# Patient Record
Sex: FEMALE | URBAN - METROPOLITAN AREA
[De-identification: names, ages, dates, MRNs, and addresses within clinical notes are randomized per-mention and may not be internally consistent; named-entity substitution may affect disease eponyms.]

---

## 2024-04-16 ENCOUNTER — APPOINTMENT (RX ONLY)
Dept: URBAN - METROPOLITAN AREA CLINIC 173 | Facility: CLINIC | Age: 69
Setting detail: DERMATOLOGY
End: 2024-04-16

## 2024-04-16 DIAGNOSIS — L90.5 SCAR CONDITIONS AND FIBROSIS OF SKIN: ICD-10-CM

## 2024-04-16 DIAGNOSIS — Z41.9 ENCOUNTER FOR PROCEDURE FOR PURPOSES OTHER THAN REMEDYING HEALTH STATE, UNSPECIFIED: ICD-10-CM

## 2024-04-16 PROCEDURE — ? COSMETIC CONSULTATION: SCLEROTHERAPY

## 2024-04-16 PROCEDURE — ? DIAGNOSIS COMMENT

## 2024-04-16 PROCEDURE — ? INTRALESIONAL KENALOG

## 2024-04-16 PROCEDURE — ? BOTOX

## 2024-04-16 PROCEDURE — ? TREATMENT REGIMEN

## 2024-04-16 PROCEDURE — ? COSMETIC CONSULTATION: BOTOX

## 2024-04-16 PROCEDURE — ? OTHER (COSMETIC)

## 2024-04-16 ASSESSMENT — LOCATION ZONE DERM: LOCATION ZONE: NECK

## 2024-04-16 ASSESSMENT — LOCATION DETAILED DESCRIPTION DERM: LOCATION DETAILED: LEFT SUPERIOR ANTERIOR NECK

## 2024-04-16 ASSESSMENT — LOCATION SIMPLE DESCRIPTION DERM: LOCATION SIMPLE: LEFT ANTERIOR NECK

## 2024-04-16 NOTE — PROCEDURE: BOTOX
Mentalis Units: 0
Show Additional Area 3: Yes
Detail Level: Detailed
Show Ucl Units: No
Additional Area 1 Units: 71
Consent: Written consent obtained. Risks include but not limited to lid/brow ptosis, bruising, swelling and incomplete chemical denervation.
Additional Area 1 Location: face
Dilution (U/0.1 Cc): 2
Lot #: [id number]
Price (Use Numbers Only, No Special Characters Or $): 208
Incrementing Botox Units: By 0.5 Units
Expiration Date (Month Year): 05/26

## 2024-04-16 NOTE — PROCEDURE: INTRALESIONAL KENALOG
X Size Of Lesion In Cm (Optional): 0
Consent: The risks of atrophy were reviewed with the patient.
Include Z78.9 (Other Specified Conditions Influencing Health Status) As An Associated Diagnosis?: No
Total Volume (Ccs): 2
Validate Note Data When Using Inventory: Yes
Expiration Date For Kenalog (Optional): 12/2025
Kenalog Type Of Vial: Multiple Dose
Medical Necessity Clause: This procedure was medically necessary because the lesions that were treated were:
Concentration Of Kenalog Solution Injected (Mg/Ml): 10.0
Kenalog Preparation: Kenalog
Lot # For Kenalog (Optional): [id number]
Detail Level: Detailed

## 2024-04-16 NOTE — PROCEDURE: DIAGNOSIS COMMENT
Comment: Discussed options to improve the neck. Try botox today.
Detail Level: Simple
Render Risk Assessment In Note?: no

## 2024-11-12 ENCOUNTER — APPOINTMENT (RX ONLY)
Dept: URBAN - METROPOLITAN AREA CLINIC 173 | Facility: CLINIC | Age: 69
Setting detail: DERMATOLOGY
End: 2024-11-12

## 2024-11-12 DIAGNOSIS — Z41.9 ENCOUNTER FOR PROCEDURE FOR PURPOSES OTHER THAN REMEDYING HEALTH STATE, UNSPECIFIED: ICD-10-CM

## 2024-11-12 PROCEDURE — ? BOTOX

## 2024-11-12 PROCEDURE — ? DIAGNOSIS COMMENT

## 2024-11-12 PROCEDURE — ? FILLERS

## 2024-11-12 PROCEDURE — ? OTHER

## 2024-11-12 NOTE — PROCEDURE: DIAGNOSIS COMMENT
Comment: Discussed possible CO2 neck. Series of 1-2.
Render Risk Assessment In Note?: no
Detail Level: Simple

## 2024-11-12 NOTE — PROCEDURE: OTHER
Other (Free Text): Discussed  lips next visit.
Note Text (......Xxx Chief Complaint.): This diagnosis correlates with the
Render Risk Assessment In Note?: no
Detail Level: Zone

## 2024-11-12 NOTE — PROCEDURE: BOTOX
Mentalis Units: 0
Show Additional Area 3: Yes
Detail Level: Detailed
Show Ucl Units: No
Additional Area 1 Units: 71
Consent: Written consent obtained. Risks include but not limited to lid/brow ptosis, bruising, swelling and incomplete chemical denervation.
Additional Area 1 Location: face
Dilution (U/0.1 Cc): 2
Lot #: [id number]
Price (Use Numbers Only, No Special Characters Or $): 023
Incrementing Botox Units: By 0.5 Units
Expiration Date (Month Year): 3/27

## 2024-11-12 NOTE — PROCEDURE: FILLERS
Include Cannula Information In Note?: No
[place] Filler Volume In Cc: 0
Lot #: [id number]
Filler: RHA 2
Expiration Date (Month Year): 12/31/26
Anesthesia Volume In Cc: 0.5
Additional Anesthesia Volume In Cc: 6
Lot #: [id number] ***[facility] Special***
Detail Level: Detailed
Additional Area 1 Location: face
Expiration Date (Month Year): 2/6/27
Price (Use Numbers Only, No Special Characters Or $): 7383
Additional Area 1 Volume In Cc: 1
Filler: [facility] [place]
Include Cannula Length?: 1.5 inch
Map Statment: See Attach Map for Complete Details
Consent: Risks reviewed including but not limited to bruising, irregular texture, incomplete augmentation and procedural pain. Written consent obtained.

## 2024-11-18 ENCOUNTER — APPOINTMENT (RX ONLY)
Dept: URBAN - METROPOLITAN AREA CLINIC 173 | Facility: CLINIC | Age: 69
Setting detail: DERMATOLOGY
End: 2024-11-18

## 2024-11-18 DIAGNOSIS — Z41.9 ENCOUNTER FOR PROCEDURE FOR PURPOSES OTHER THAN REMEDYING HEALTH STATE, UNSPECIFIED: ICD-10-CM

## 2024-11-18 PROCEDURE — ? ULTRAPULSE LASER

## 2024-11-18 PROCEDURE — ? PRESCRIPTION

## 2024-11-18 PROCEDURE — ? TREATMENT REGIMEN

## 2024-11-18 PROCEDURE — ? BOTOX

## 2024-11-18 PROCEDURE — ? DIAGNOSIS COMMENT

## 2024-11-18 PROCEDURE — ? FRAXEL

## 2024-11-18 RX ORDER — PHARMACY COMPOUNDING ACCESSORY
EACH MISCELLANEOUS
Qty: 30 | Refills: 4 | Status: ERX | COMMUNITY
Start: 2024-11-18

## 2024-11-18 RX ADMIN — Medication: at 00:00

## 2024-11-18 ASSESSMENT — LOCATION SIMPLE DESCRIPTION DERM
LOCATION SIMPLE: CHEST
LOCATION SIMPLE: RIGHT ANTERIOR NECK

## 2024-11-18 ASSESSMENT — LOCATION DETAILED DESCRIPTION DERM
LOCATION DETAILED: UPPER STERNUM
LOCATION DETAILED: RIGHT INFERIOR ANTERIOR NECK

## 2024-11-18 ASSESSMENT — LOCATION ZONE DERM
LOCATION ZONE: TRUNK
LOCATION ZONE: NECK

## 2024-11-18 NOTE — PROCEDURE: BOTOX
Lateral Platysmal Bands Units: 0
Expiration Date (Month Year): 03/2027
Show Additional Area 6: Yes
Show Right And Left Brow Units: No
Consent: Written consent obtained. Risks include but not limited to lid/brow ptosis, bruising, swelling and incomplete chemical denervation.
Additional Area 1 Location: face
Additional Area 1 Units: 4
Lot #: [id number] *[facility] Special*
Dilution (U/0.1 Cc): 2
Incrementing Botox Units: By 0.5 Units
Detail Level: Detailed

## 2024-11-18 NOTE — PROCEDURE: DIAGNOSIS COMMENT
Comment: Co2 neck ($1200 / treatment) & Fraxel Chest ($500 / treatment). Patient given kit and gauze. Patient given co2 & fraxel handout.
Detail Level: Simple
Render Risk Assessment In Note?: no

## 2024-11-18 NOTE — PROCEDURE: FRAXEL
Treatment Level: 1
Location: perioral area
Number Of Passes: 8
Depth In Microns (Use Numbers Only, No Special Characters Or $): 196
Length Of Topical Anesthesia Application (Optional): 60 minutes
Small Plastic Eye Shield Text: The ocular mucosa was anesthetized with tetracaine. Once adequate anesthesia was optained, small plastic eye shields were inserted and remained in place until the procedure was completed.
Total Coverage: 35%
Was An Eye Shield Used?: No
Price (Use Numbers Only, No Special Characters Or $): 500
Medium Plastic Eye Shield Text: The ocular mucosa was anesthetized with tetracaine. Once adequate anesthesia was optained, medium plastic eye shields were inserted and remained in place until the procedure was completed.
Energy(Mj/Cm2): 60
Detail Level: Zone
Post-Care Instructions: I reviewed with the patient in detail post-care instructions. Patient should avoid sun until area fully healed.
Total Energy In Kj (Optional- Don't Include Units): 0.93
Treatment Level: 6
Small Metal Eye Shield Text: The ocular mucosa was anesthetized with tetracaine. Once adequate anesthesia was optained, small metal eye shields were inserted and remained in place until the procedure was completed.
Wavelength: 1927nm
Large Plastic Eye Shield Text: The ocular mucosa was anesthetized with tetracaine. Once adequate anesthesia was optained, large plastic eye shields were inserted and remained in place until the procedure was completed.
Indication: dyschromia
Medium Metal Eye Shield Text: The ocular mucosa was anesthetized with tetracaine. Once adequate anesthesia was optained, medium metal eye shields were inserted and remained in place until the procedure was completed.
Tip: 15mm
Consent: Written consent obtained, risks reviewed including but not limited to pain and incomplete improvement.
Energy(Mj/Cm2): 10
Treatment Level: 4
Location: decolletage of the chest
External Cooling: Shama Cryo 5
Large Metal Eye Shield Text: The ocular mucosa was anesthetized with tetracaine. Once adequate anesthesia was optained, large metal eye shields were inserted and remained in place until the procedure was completed.
Topical Anesthesia Type: 30% lidocaine, plasticized base

## 2024-11-18 NOTE — PROCEDURE: ULTRAPULSE LASER
Cool Scan: on
Density % (Set To 0 If Using Density Field Above): 0
Number Of Passes: 1
Price (Use Numbers Only, No Special Characters Or $): 1200
Additional Laser Settings?: no
Density- (Set To 0 If Using Density % Field Below): 2
Anesthesia Type: 1% lidocaine with epinephrine
Detail Level: Zone
Bc: 300
Location: neck
Hsv Prophylaxis Prescription: Valtrex 500mg BID starting the day of procedures and continuing until all sites healed
Topical Anesthesia Type: 30% lidocaine, plasticized base
Consent: Written consent obtained, risks reviewed including but not limited to crusting, scabbing, blistering, scarring, darker or lighter pigmentary change, incomplete improvement of dyschromia, wrinkles, prolonged erythema and facial swelling, infection and bleeding.
External Cooling: Shama Cryo 5
Length Of Topical Anesthesia Application (Optional): 60 minutes
Energy (Mj): 90
Anesthesia Volume In Cc (Optional): 9
Post-Care Instructions: I reviewed with the patient in detail post-care instructions. Patient should avoid sun until area fully healed. Pt should apply vaseline to treated areas.
Anesthesia Method: local infiltration

## 2024-11-21 ENCOUNTER — RX ONLY (OUTPATIENT)
Age: 69
Setting detail: RX ONLY
End: 2024-11-21

## 2024-11-21 RX ORDER — TRIAMCINOLONE ACETONIDE 1 MG/G
OINTMENT TOPICAL
Qty: 30 | Refills: 1 | Status: ERX | COMMUNITY
Start: 2024-11-21

## 2024-11-21 RX ORDER — DOXYCYCLINE 100 MG/1
CAPSULE ORAL
Qty: 14 | Refills: 0 | Status: ERX | COMMUNITY
Start: 2024-11-21

## 2024-11-21 RX ORDER — PREDNISONE 10 MG/1
TABLET ORAL
Qty: 8 | Refills: 0 | Status: ERX | COMMUNITY
Start: 2024-11-21

## 2025-04-01 ENCOUNTER — APPOINTMENT (OUTPATIENT)
Dept: URBAN - METROPOLITAN AREA CLINIC 173 | Facility: CLINIC | Age: 70
Setting detail: DERMATOLOGY
End: 2025-04-01

## 2025-04-01 DIAGNOSIS — Z41.9 ENCOUNTER FOR PROCEDURE FOR PURPOSES OTHER THAN REMEDYING HEALTH STATE, UNSPECIFIED: ICD-10-CM

## 2025-04-01 PROCEDURE — ? BOTOX

## 2025-04-01 NOTE — PROCEDURE: BOTOX
Additional Area 4 Units: 0
Show Depressor Anguli Units: Yes
Additional Area 1 Units: 87
Show Right And Left Pupillary Line Units: No
Detail Level: Detailed
Expiration Date (Month Year): 5/27
Price (Use Numbers Only, No Special Characters Or $): 0129
Consent: Written consent obtained. Risks include but not limited to lid/brow ptosis, bruising, swelling and incomplete chemical denervation.
Lot #: [id number] *[facility] special *
Incrementing Botox Units: By 0.5 Units
Additional Area 1 Location: face
Dilution (U/0.1 Cc): 2